# Patient Record
Sex: MALE | Race: OTHER | HISPANIC OR LATINO | URBAN - METROPOLITAN AREA
[De-identification: names, ages, dates, MRNs, and addresses within clinical notes are randomized per-mention and may not be internally consistent; named-entity substitution may affect disease eponyms.]

---

## 2021-04-19 ENCOUNTER — EMERGENCY (EMERGENCY)
Facility: HOSPITAL | Age: 26
LOS: 1 days | Discharge: HOME | End: 2021-04-19
Attending: EMERGENCY MEDICINE | Admitting: EMERGENCY MEDICINE
Payer: MEDICAID

## 2021-04-19 VITALS
DIASTOLIC BLOOD PRESSURE: 68 MMHG | WEIGHT: 164.91 LBS | HEART RATE: 88 BPM | SYSTOLIC BLOOD PRESSURE: 110 MMHG | OXYGEN SATURATION: 96 % | RESPIRATION RATE: 18 BRPM | TEMPERATURE: 98 F

## 2021-04-19 DIAGNOSIS — Z91.013 ALLERGY TO SEAFOOD: ICD-10-CM

## 2021-04-19 DIAGNOSIS — H57.11 OCULAR PAIN, RIGHT EYE: ICD-10-CM

## 2021-04-19 DIAGNOSIS — H20.9 UNSPECIFIED IRIDOCYCLITIS: ICD-10-CM

## 2021-04-19 PROCEDURE — 99283 EMERGENCY DEPT VISIT LOW MDM: CPT

## 2021-04-19 RX ORDER — CIPROFLOXACIN HCL 0.3 %
2 DROPS OPHTHALMIC (EYE)
Qty: 1 | Refills: 0
Start: 2021-04-19 | End: 2021-04-23

## 2021-04-19 NOTE — ED ADULT TRIAGE NOTE - CHIEF COMPLAINT QUOTE
Pt presents reporting his dog scratched his left eye last wednesday, left eye healed. Then his dog scratched his right eye on friday, eye is persistently red, teary, painful w/ movement.

## 2021-04-19 NOTE — ED PROVIDER NOTE - OBJECTIVE STATEMENT
25 y/o male with no significant PMHx presents today c/o right eye irritation x 3 days s/p dog's nose scratching his eye. Pt admits to blurry vision, EOM pain, photophobia, and increased lacrimation. Pt denies any discharge from eye. Pt states his left eye was scratched by the dog's whisker 5 days ago, but that eye has healed completely. Pt has taken Benadryl to sleep and used "Clear Eyes" eye drops. Pt denies wearing contacts. 27 y/o male with no significant PMHx presents today c/o right eye irritation x 3 days s/p dog's nose scratching his eye. Pt admits to blurry vision, photophobia, and increased lacrimation. Pt denies any discharge from eye. Pt states his left eye was scratched by the dog's whisker 5 days ago, but that eye has healed completely. Pt has taken Benadryl to sleep and used "Clear Eyes" eye drops. Pt denies wearing contacts.

## 2021-04-19 NOTE — ED PROVIDER NOTE - CLINICAL SUMMARY MEDICAL DECISION MAKING FREE TEXT BOX
right eye pain s/p struck by dog nose. visual acuity intact. no abrasion or ulcer on exam. eye irrigated with improvement suspect iritis. will tx with ciloxan and f/u with opth. return precautions d/w pt.

## 2021-04-19 NOTE — ED PROVIDER NOTE - PATIENT PORTAL LINK FT
You can access the FollowMyHealth Patient Portal offered by [facility] [place] by registering at the following website: [email or web address]. By joining [employer]’s FollowMyHealth portal, you will also be able to view your health information using other applications (apps) compatible with our system.

## 2021-04-19 NOTE — ED PROVIDER NOTE - EYES, MLM
Walk in [employer] right eye injected.. PERLL b/l. EOMI. no d/c. no uptake of dye. no visible ulcer or abrasion. no FB. lid inverted. visual acuity 20/30 right and 20/15 left. eye irrigated with improvement

## 2021-04-19 NOTE — ED PROVIDER NOTE - PHYSICAL EXAMINATION
Klep[place]: unable to use slit lamp at this time. has minimal pain to R eye w/ penlight, has no pain to R eye when penlight is shone into L eye. no hyphema, no chemosis.

## 2021-04-19 NOTE — ED ADULT NURSE NOTE - NSIMPLEMENTINTERV_GEN_ALL_ED
Implemented All Universal Safety Interventions:  [place] to call system. Call bell, personal items and telephone within reach. Instruct patient to call for assistance. Room bathroom lighting operational. Non-slip footwear when patient is off stretcher. Physically safe environment: no spills, clutter or unnecessary equipment. Stretcher in lowest position, wheels locked, appropriate side rails in place.

## 2021-04-19 NOTE — ED PROVIDER NOTE - NSFOLLOWUPCLINICS_GEN_ALL_ED_FT
[facility] - Ophthalmology Clinic  Ophthalmology  [address] [place] [address], 1st [address]  [place], [place] [address]  Phone: [phone]  Fax:   Follow Up Time: Urgent

## 2021-04-19 NOTE — ED PROVIDER NOTE - ATTENDING CONTRIBUTION TO CARE
26M no PMH p/w eye pain. 3d ago pt's dog jumped at him and dog's nose hit him directly in R eye. Since then he has pain to R eye, mild blurry vision and lacrimation. Symptoms actually slowly improving but still persistent so GF convinced pt to come to ED.   Denies diplopia, HA, NVD, f/c, other injuries, FB sensation. Vitals wnl, exam as above. Very well appearing.   ddx: Concern for possible traumatic iritis vs. conjunctivitis.   Symptoms occurred 3d ago and slowly improving - Clinically no indication for further emergent ophtho consult or ED workup or hospitalization at this time. Comfortable for dc, outpt f/u. discussed importance of outpt ophtho f/u.

## 2021-04-19 NOTE — ED PROVIDER NOTE - NSFOLLOWUPINSTRUCTIONS_ED_ALL_ED_FT
Follow up with ophthamology in one day.       Iritis    WHAT YOU NEED TO KNOW:    Iritis is inflammation of your iris. The iris is the colored part of your eye.    Eye Anatomy         DISCHARGE INSTRUCTIONS:    Call your doctor or ophthalmologist if:   •You have severe eye pain and a headache.      •Your vision suddenly gets worse.      •You have nausea or are vomiting.      •Your pain gets worse, even after treatment.      •You see halos or rainbows around lights.      •You have questions or concerns about your condition or care.      Medicines: You may need any of the following:   •Cycloplegic eyedrops dilate your pupil and relax your eye muscles. This helps decrease pain and light sensitivity.      •Steroid eyedrops help decrease pain and inflammation. These are only used for a short time to relieve the inflammation. You may be given steroid medicine as pills if the cause of your iritis is not an infection.      •Acetaminophen decreases pain and fever. It is available without a doctor's order. Ask how much to take and how often to take it. Follow directions. Read the labels of all other medicines you are using to see if they also contain acetaminophen, or ask your doctor or pharmacist. Acetaminophen can cause liver damage if not taken correctly. Do not use more than 4 grams (4,000 milligrams) total of acetaminophen in one day.       •Take your medicine as directed. Contact your healthcare provider if you think your medicine is not helping or if you have side effects. Tell him of her if you are allergic to any medicine. Keep a list of the medicines, vitamins, and herbs you take. Include the amounts, and when and why you take them. Bring the list or the pill bottles to follow-up visits. Carry your medicine list with you in case of an emergency.      Manage iritis:   •Apply a warm compress to your eye. Wet a washcloth in warm water and wring it out. Place it gently over your eye for 20 minutes 3 to 4 times each day. This will help soothe your eye and decrease inflammation.      •Wear dark sunglasses. This will help prevent pain and light sensitivity. Make sure the sunglasses have UVA and UVB protection. This will protect your eyes when you go outside.      •Use eyedrops safely. If your treatment plan includes eyedrops, it is important to use them as directed. Your provider may give you detailed instructions to follow. The eyedrops may also come with safety instructions. Follow all instructions to help prevent an infection. Do not touch the tip of the bottle to your eye. Germs from your eye can spread to the medicine bottle.  Steps 1 2 3 4           Follow up with your doctor or ophthalmologist within 24 hours: Write down your questions so you remember to ask them during your visits.       © Copyright [employer] 2021           back to top                          © Copyright [employer] 2021

## 2021-04-19 NOTE — ED ADULT NURSE NOTE - OBJECTIVE STATEMENT
26 y.o M a&ox4 walked in from triage c.o eye pain. since Friday pt reports R eye pain and tearing. PT states " my dog jumped up and hit my eye. I cant open it." reports R eye pain. no fevers, chills.

## 2023-09-06 ENCOUNTER — EMERGENCY (EMERGENCY)
Facility: HOSPITAL | Age: 28
LOS: 1 days | Discharge: HOME | End: 2023-09-06
Attending: EMERGENCY MEDICINE | Admitting: EMERGENCY MEDICINE
Payer: COMMERCIAL

## 2023-09-06 VITALS
OXYGEN SATURATION: 99 % | HEART RATE: 64 BPM | DIASTOLIC BLOOD PRESSURE: 64 MMHG | SYSTOLIC BLOOD PRESSURE: 118 MMHG | TEMPERATURE: 98 F | RESPIRATION RATE: 18 BRPM

## 2023-09-06 VITALS
SYSTOLIC BLOOD PRESSURE: 121 MMHG | TEMPERATURE: 98 F | HEART RATE: 61 BPM | DIASTOLIC BLOOD PRESSURE: 81 MMHG | RESPIRATION RATE: 18 BRPM | OXYGEN SATURATION: 99 % | WEIGHT: 162.04 LBS

## 2023-09-06 DIAGNOSIS — R19.7 DIARRHEA, UNSPECIFIED: ICD-10-CM

## 2023-09-06 DIAGNOSIS — Z91.013 ALLERGY TO SEAFOOD: ICD-10-CM

## 2023-09-06 DIAGNOSIS — K51.90 ULCERATIVE COLITIS, UNSPECIFIED, WITHOUT COMPLICATIONS: ICD-10-CM

## 2023-09-06 DIAGNOSIS — F17.210 NICOTINE DEPENDENCE, CIGARETTES, UNCOMPLICATED: ICD-10-CM

## 2023-09-06 LAB
ALBUMIN SERPL ELPH-MCNC: 4 G/DL — SIGNIFICANT CHANGE UP (ref 3.3–5)
ALP SERPL-CCNC: 96 U/L — SIGNIFICANT CHANGE UP (ref 40–120)
ALT FLD-CCNC: 11 U/L — SIGNIFICANT CHANGE UP (ref 10–45)
ANION GAP SERPL CALC-SCNC: 9 MMOL/L — SIGNIFICANT CHANGE UP (ref 5–17)
AST SERPL-CCNC: 23 U/L — SIGNIFICANT CHANGE UP (ref 10–40)
BASOPHILS # BLD AUTO: 0.06 K/UL — SIGNIFICANT CHANGE UP (ref 0–0.2)
BASOPHILS NFR BLD AUTO: 0.5 % — SIGNIFICANT CHANGE UP (ref 0–2)
BILIRUB SERPL-MCNC: 0.5 MG/DL — SIGNIFICANT CHANGE UP (ref 0.2–1.2)
BUN SERPL-MCNC: 6 MG/DL — LOW (ref 7–23)
CALCIUM SERPL-MCNC: 9.7 MG/DL — SIGNIFICANT CHANGE UP (ref 8.4–10.5)
CHLORIDE SERPL-SCNC: 101 MMOL/L — SIGNIFICANT CHANGE UP (ref 96–108)
CO2 SERPL-SCNC: 29 MMOL/L — SIGNIFICANT CHANGE UP (ref 22–31)
CREAT SERPL-MCNC: 1.14 MG/DL — SIGNIFICANT CHANGE UP (ref 0.5–1.3)
EGFR: 90 ML/MIN/1.73M2 — SIGNIFICANT CHANGE UP
EOSINOPHIL # BLD AUTO: 0.19 K/UL — SIGNIFICANT CHANGE UP (ref 0–0.5)
EOSINOPHIL NFR BLD AUTO: 1.5 % — SIGNIFICANT CHANGE UP (ref 0–6)
GLUCOSE SERPL-MCNC: 86 MG/DL — SIGNIFICANT CHANGE UP (ref 70–99)
HCT VFR BLD CALC: 47.9 % — SIGNIFICANT CHANGE UP (ref 39–50)
HGB BLD-MCNC: 16.4 G/DL — SIGNIFICANT CHANGE UP (ref 13–17)
IMM GRANULOCYTES NFR BLD AUTO: 0.4 % — SIGNIFICANT CHANGE UP (ref 0–0.9)
LIDOCAIN IGE QN: 69 U/L — HIGH (ref 7–60)
LYMPHOCYTES # BLD AUTO: 1.27 K/UL — SIGNIFICANT CHANGE UP (ref 1–3.3)
LYMPHOCYTES # BLD AUTO: 9.7 % — LOW (ref 13–44)
MCHC RBC-ENTMCNC: 29.7 PG — SIGNIFICANT CHANGE UP (ref 27–34)
MCHC RBC-ENTMCNC: 34.2 GM/DL — SIGNIFICANT CHANGE UP (ref 32–36)
MCV RBC AUTO: 86.6 FL — SIGNIFICANT CHANGE UP (ref 80–100)
MONOCYTES # BLD AUTO: 0.92 K/UL — HIGH (ref 0–0.9)
MONOCYTES NFR BLD AUTO: 7 % — SIGNIFICANT CHANGE UP (ref 2–14)
NEUTROPHILS # BLD AUTO: 10.58 K/UL — HIGH (ref 1.8–7.4)
NEUTROPHILS NFR BLD AUTO: 80.9 % — HIGH (ref 43–77)
NRBC # BLD: 0 /100 WBCS — SIGNIFICANT CHANGE UP (ref 0–0)
PLATELET # BLD AUTO: 266 K/UL — SIGNIFICANT CHANGE UP (ref 150–400)
POTASSIUM SERPL-MCNC: SIGNIFICANT CHANGE UP MMOL/L (ref 3.5–5.3)
POTASSIUM SERPL-SCNC: SIGNIFICANT CHANGE UP MMOL/L (ref 3.5–5.3)
PROT SERPL-MCNC: 8.1 G/DL — SIGNIFICANT CHANGE UP (ref 6–8.3)
RBC # BLD: 5.53 M/UL — SIGNIFICANT CHANGE UP (ref 4.2–5.8)
RBC # FLD: 13.3 % — SIGNIFICANT CHANGE UP (ref 10.3–14.5)
SODIUM SERPL-SCNC: 139 MMOL/L — SIGNIFICANT CHANGE UP (ref 135–145)
WBC # BLD: 13.07 K/UL — HIGH (ref 3.8–10.5)
WBC # FLD AUTO: 13.07 K/UL — HIGH (ref 3.8–10.5)

## 2023-09-06 PROCEDURE — 99284 EMERGENCY DEPT VISIT MOD MDM: CPT | Mod: 25

## 2023-09-06 PROCEDURE — 85025 COMPLETE CBC W/AUTO DIFF WBC: CPT

## 2023-09-06 PROCEDURE — 96374 THER/PROPH/DIAG INJ IV PUSH: CPT | Mod: XU

## 2023-09-06 PROCEDURE — G1004: CPT

## 2023-09-06 PROCEDURE — 74177 CT ABD & PELVIS W/CONTRAST: CPT | Mod: ME

## 2023-09-06 PROCEDURE — 80053 COMPREHEN METABOLIC PANEL: CPT

## 2023-09-06 PROCEDURE — 36415 COLL VENOUS BLD VENIPUNCTURE: CPT

## 2023-09-06 PROCEDURE — 74177 CT ABD & PELVIS W/CONTRAST: CPT | Mod: 26,ME

## 2023-09-06 PROCEDURE — 83690 ASSAY OF LIPASE: CPT

## 2023-09-06 PROCEDURE — 99285 EMERGENCY DEPT VISIT HI MDM: CPT

## 2023-09-06 PROCEDURE — 96375 TX/PRO/DX INJ NEW DRUG ADDON: CPT

## 2023-09-06 RX ORDER — ONDANSETRON 8 MG/1
4 TABLET, FILM COATED ORAL ONCE
Refills: 0 | Status: COMPLETED | OUTPATIENT
Start: 2023-09-06 | End: 2023-09-06

## 2023-09-06 RX ORDER — FAMOTIDINE 10 MG/ML
20 INJECTION INTRAVENOUS ONCE
Refills: 0 | Status: COMPLETED | OUTPATIENT
Start: 2023-09-06 | End: 2023-09-06

## 2023-09-06 RX ORDER — IOHEXOL 300 MG/ML
30 INJECTION, SOLUTION INTRAVENOUS ONCE
Refills: 0 | Status: COMPLETED | OUTPATIENT
Start: 2023-09-06 | End: 2023-09-06

## 2023-09-06 RX ORDER — MORPHINE SULFATE 50 MG/1
4 CAPSULE, EXTENDED RELEASE ORAL ONCE
Refills: 0 | Status: DISCONTINUED | OUTPATIENT
Start: 2023-09-06 | End: 2023-09-06

## 2023-09-06 RX ORDER — OMEPRAZOLE 10 MG/1
1 CAPSULE, DELAYED RELEASE ORAL
Qty: 14 | Refills: 0
Start: 2023-09-06 | End: 2023-09-19

## 2023-09-06 RX ORDER — HYDROCORTISONE 20 MG
1 TABLET ORAL
Qty: 5 | Refills: 0
Start: 2023-09-06 | End: 2023-09-10

## 2023-09-06 RX ORDER — OXYCODONE AND ACETAMINOPHEN 5; 325 MG/1; MG/1
1 TABLET ORAL
Qty: 12 | Refills: 0
Start: 2023-09-06 | End: 2023-09-08

## 2023-09-06 RX ORDER — HYDROMORPHONE HYDROCHLORIDE 2 MG/ML
0.5 INJECTION INTRAMUSCULAR; INTRAVENOUS; SUBCUTANEOUS ONCE
Refills: 0 | Status: DISCONTINUED | OUTPATIENT
Start: 2023-09-06 | End: 2023-09-06

## 2023-09-06 RX ORDER — SODIUM CHLORIDE 9 MG/ML
1000 INJECTION INTRAMUSCULAR; INTRAVENOUS; SUBCUTANEOUS ONCE
Refills: 0 | Status: COMPLETED | OUTPATIENT
Start: 2023-09-06 | End: 2023-09-06

## 2023-09-06 RX ADMIN — FAMOTIDINE 20 MILLIGRAM(S): 10 INJECTION INTRAVENOUS at 20:08

## 2023-09-06 RX ADMIN — HYDROMORPHONE HYDROCHLORIDE 0.5 MILLIGRAM(S): 2 INJECTION INTRAMUSCULAR; INTRAVENOUS; SUBCUTANEOUS at 18:16

## 2023-09-06 RX ADMIN — MORPHINE SULFATE 4 MILLIGRAM(S): 50 CAPSULE, EXTENDED RELEASE ORAL at 15:17

## 2023-09-06 RX ADMIN — IOHEXOL 30 MILLILITER(S): 300 INJECTION, SOLUTION INTRAVENOUS at 15:17

## 2023-09-06 RX ADMIN — SODIUM CHLORIDE 1000 MILLILITER(S): 9 INJECTION INTRAMUSCULAR; INTRAVENOUS; SUBCUTANEOUS at 15:17

## 2023-09-06 RX ADMIN — ONDANSETRON 4 MILLIGRAM(S): 8 TABLET, FILM COATED ORAL at 15:36

## 2023-09-06 NOTE — ED PROVIDER NOTE - NSFOLLOWUPINSTRUCTIONS_ED_ALL_ED_FT
follow up Dr Hilton in 5 days  return for fever, worsening pain, vomiting, any other worsening symptoms    Ulcerative Colitis, Adult    Ulcerative colitis is long-term (chronic) inflammation of the large intestine (colon) and rectum. Sores (ulcers) may also form in these areas.    Ulcerative colitis, along with a closely related condition called Crohn's disease, is often referred to as inflammatory bowel disease.    What are the causes?  This condition may be caused by increased activity of the immune system in the intestines. The immune system is the system that protects the body against harmful bacteria, viruses, fungi, and other things that can make you sick. The cause of the increased activity of the immune system is not known.    What increases the risk?  The following factors may make you more likely to develop this condition:  Being under 30 years old.  Having a family history of ulcerative colitis.  What are the signs or symptoms?  Symptoms vary depending on how severe the condition is. Common symptoms include:  Rectal bleeding.  Diarrhea, often with blood or pus in the stool.  Other symptoms can include:  Pain or cramping in the abdomen.  Fever.  Tiredness (fatigue).  Nausea, loss of appetite, or weight loss.  Rectal pain.  A strong and sudden need to have a bowel movement (bowel urgency).  Anemia.  Yellowing of the skin (jaundice) from liver dysfunction or skin rashes.  Symptoms can range from mild to severe. They may come and go.    How is this diagnosed?  This condition may be diagnosed based on:  Your symptoms and medical history.  A physical exam.  Tests, including:  Blood tests and stool tests.  X-rays.  CT scan.  MRI.  Colonoscopy. For this test, a flexible tube is inserted into your anus, and your colon is examined.  Biopsy. In this test, a tissue sample is taken from your colon and examined under a microscope.  How is this treated?  There is no cure for this condition, but it can be managed. Treatment depends on the severity of the disease. Treatment for this condition may include medicines to:  Decrease swelling and inflammation.  Control your immune system.  Treat infections.  Relieve pain.  Control diarrhea.  Severe flare-ups may need to be treated at a hospital. Treatment in a hospital may involve:  Resting the bowel. This involves not eating or drinking for a period of time.  Getting medicines through an IV.  Getting fluids and nutrition through:  An IV.  A tube that is passed through the nose and into the stomach (nasogastric or NG tube).  Surgery to remove the affected part of the colon. This may be done if other treatments are not helping.  This condition increases the risk of colon cancer. Adults with this condition will need to be checked for colon cancer throughout life.    Follow these instructions at home:  Medicines and vitamins    Take over-the-counter and prescription medicines only as told by your health care provider. Do not take aspirin.  If you were prescribed an antibiotic medicine, take it as told by your health care provider. Do not stop taking the antibiotic even if you start to feel better.  Ask your health care provider if you should take any vitamins or supplements. You may need to take:  Calcium and vitamin D for bone health.  Iron to help treat anemia.  Lifestyle    Exercise regularly.  Work with your health care provider to manage your condition and educate yourself about your condition.  Do not use any products that contain nicotine or tobacco. These products include cigarettes, chewing tobacco, and vaping devices, such as e-cigarettes. If you need help quitting, ask your health care provider.  If you drink alcohol:  Limit how much you have to:  0–1 drink a day for women who are not pregnant.  0–2 drinks a day for men.  Know how much alcohol is in a drink. In the [place] one drink equals one 12 oz bottle of beer (355 mL), one 5 oz glass of wine (148 mL), or one 1½ oz glass of hard liquor (44 mL).  Eating and drinking    Keep a food diary. This may help you identify and avoid any foods that trigger your symptoms.  Drink enough fluid to keep your urine pale yellow.  Follow a well-balanced diet as told by your health care provider. This may include:  Avoiding carbonated drinks.  Avoiding popcorn, vegetable skins, nuts, and other high-fiber foods.  Avoiding high-fat foods.  Eating smaller meals, but more often.  Limiting sugary drinks.  Limiting caffeine.  Follow food safety recommendations as told by your health care provider. This may include making sure you:  Avoid eating raw or undercooked meat, fish, or eggs.  Do not eat or drink spoiled or  foods and drinks.  General instructions    Wash your hands often with soap and water for at least 20 seconds. If soap and water are not available, use hand .  Stay up to date on your vaccinations, including a yearly (annual) flu shot. Ask your health care provider which vaccines you should get.  Have cancer screening tests as told by your health care provider. Ulcerative colitis may place you at increased risk for colon cancer.  Keep all follow-up visits. This is important.  Where to find more information  You can find some helpful and educational information about ulcerative colitis at the [employer] and [employer]est[employer] Kid[employer] Diseases online here: www.niddk.nih.gov    Contact a health care provider if:  Your symptoms do not improve or they get worse with treatment.  You continue to lose weight.  You have constant cramps or loose stools.  You develop a new skin rash, skin sores, or eye problems.  You have a fever or chills.  Get help right away if:  You have bloody diarrhea.  You have severe bleeding from the rectum.  You feel that your heart is racing.  You have severe pain in your abdomen.  Your abdomen swells (abdominal distension).  Your abdomen is tender to the touch.  You vomit.  Summary  Ulcerative colitis is long-lasting (chronic) inflammation of the large intestine (colon) and rectum. Sores (ulcers) may also form in these areas.  Follow instructions from your health care provider about medicines, lifestyle changes, and eating and drinking.  Contact your health care provider if symptoms do not improve or they get worse with treatment.  Get help right away if you have severe abdominal pain, abdominal swelling, or severe bleeding from the rectum.  Keep all follow-up visits. This is important.  This information is not intended to replace advice given to you by your health care provider. Make sure you discuss any questions you have with your health care provider.    Document Revised: 2021 Document Reviewed: 2021

## 2023-09-06 NOTE — ED PROVIDER NOTE - PATIENT PORTAL LINK FT
You can access the FollowMyHealth Patient Portal offered by [facility] by registering at the following website: [email or web address]. By joining [employer]’s FollowMyHealth portal, you will also be able to view your health information using other applications (apps) compatible with our system.

## 2023-09-06 NOTE — ED PROVIDER NOTE - OBJECTIVE STATEMENT
28-year-old male with history of ulcerative colitis for the past 4 years severe mid to left abdominal pain for past day and a loose stools with some blood in it.  He contacted his GI doctor 3 days ago who started him on a p.o. medication.  last colonoscopy 1 year ago never had a CAT scan.  Never had to be hospitalized for this.  no fever or vomiting.  Able to drink fluids today appetite

## 2023-09-06 NOTE — ED ADULT NURSE NOTE - OBJECTIVE STATEMENT
28yoM PMH UC, came to the ED c/o abdominal pain N/V/D X2 days. Pt states he had two episodes of green emesis yesterday and today, multiple episodes of bloody diarrhea as well. Pt states he originally thought it was hunger pain, and that this doesn't feel like a UC flare up. Denies chest pain, SOB, HA, fever.

## 2023-09-06 NOTE — ED PROVIDER NOTE - CLINICAL SUMMARY MEDICAL DECISION MAKING FREE TEXT BOX
hx of ulc colitis with probable flare, abd pain and diarrhea with blood tinged.  will get labs, ct abd, give pain meds

## 2023-09-06 NOTE — ED ADULT NURSE NOTE - NSFALLUNIVINTERV_ED_ALL_ED
Bed/Stretcher in lowest position, wheels locked, appropriate side rails in place/Call bell, personal items and telephone in reach/Instruct patient to call for assistance before getting out of bed/chair/stretcher/Non-slip footwear applied when patient is off stretcher/[place] to call system/Physically safe environment - no spills, clutter or unnecessary equipment/Purposeful proactive rounding/Room/bathroom lighting operational, light cord in reach

## 2023-09-06 NOTE — ED PROVIDER NOTE - NOTES
spoke with partner of Dr Hilton.  he wants pt to have once a day hydrocortisone enema and to see Dr Hilton in 5 days

## 2023-09-06 NOTE — ED ADULT TRIAGE NOTE - CHIEF COMPLAINT QUOTE
Pt aaox3 c/o periumbilical abd pain for 3x days. Pt c/o diarrhea and 1x N/V. Pt has pmhx of ulcerative colitis.

## 2023-09-07 RX ORDER — HYDROCORTISONE 20 MG
1 TABLET ORAL
Refills: 0
Start: 2023-09-07 | End: 2023-09-11

## 2023-09-07 RX ORDER — HYDROCORTISONE 20 MG
1 TABLET ORAL
Qty: 5 | Refills: 0
Start: 2023-09-07 | End: 2023-09-11

## 2023-09-07 RX ORDER — OXYCODONE AND ACETAMINOPHEN 5; 325 MG/1; MG/1
1 TABLET ORAL
Qty: 12 | Refills: 0
Start: 2023-09-07 | End: 2023-09-09

## 2024-04-19 ENCOUNTER — EMERGENCY (EMERGENCY)
Facility: HOSPITAL | Age: 29
LOS: 1 days | Discharge: HOME | End: 2024-04-19
Attending: STUDENT IN AN ORGANIZED HEALTH CARE EDUCATION/TRAINING PROGRAM | Admitting: STUDENT IN AN ORGANIZED HEALTH CARE EDUCATION/TRAINING PROGRAM
Payer: COMMERCIAL

## 2024-04-19 VITALS
RESPIRATION RATE: 17 BRPM | OXYGEN SATURATION: 99 % | WEIGHT: 160.06 LBS | TEMPERATURE: 99 F | HEART RATE: 75 BPM | DIASTOLIC BLOOD PRESSURE: 64 MMHG | SYSTOLIC BLOOD PRESSURE: 114 MMHG | HEIGHT: 72 IN

## 2024-04-19 PROCEDURE — 99283 EMERGENCY DEPT VISIT LOW MDM: CPT

## 2024-04-19 PROCEDURE — 99282 EMERGENCY DEPT VISIT SF MDM: CPT

## 2024-04-19 NOTE — ED ADULT TRIAGE NOTE - CHIEF COMPLAINT QUOTE
29M presents to ED c/o atraumatic R lower back pain not associated with numbness/tingling or bowel/bladder incontinence x2 weeks. pt ambulatory with steady gait. A&Ox4

## 2024-04-19 NOTE — ED PROVIDER NOTE - CLINICAL SUMMARY MEDICAL DECISION MAKING FREE TEXT BOX
Concern for musculoskeletal buttock pain given worsened with exertion/lifting.  No sign or symptom of acute or impending spinal cord compromise.  No sign of infection, including abscess/cellulitis/fascitis as cause for pain.  Advised patient to follow up with primary care physician, discussed OTC pain control.  Plan to discharge home with return precautions and outpatient followup.

## 2024-04-19 NOTE — ED PROVIDER NOTE - OBJECTIVE STATEMENT
29M with UC, now with chronic intermittent right upper buttock pain, exacerbated by lifting or exercise.  Has most recently had the pain for 2 weeks, no recent trauma/fall.  No groin or lower extremity numbness.  No fevers or hx IVDU.  No cancer history.  No bowel/bladder incontinence.  No leg weakness.  Able to walk.

## 2024-04-19 NOTE — ED PROVIDER NOTE - NSFOLLOWUPINSTRUCTIONS_ED_ALL_ED_FT
Chronic Back Pain  Chronic back pain is back pain that lasts longer than 3 months. The cause of your back pain may not be known. Some common causes include:  Wear and tear (degenerative disease) of the bones, disks, or tissues that connect bones to each other (ligaments) in your back.  Inflammation and stiffness in your back (arthritis).  If you have chronic back pain, you may have times when the pain is more intense (flare-ups). You can also learn to manage the pain with home care.    Follow these instructions at home:  Watch for any changes in your symptoms. Take these actions to help with your pain:    Managing pain and stiffness    A bag of ice on a towel on the skin.  A heating pad for use on the affected area.  If told, put ice on the painful area. You may be told to apply ice for the first 24–48 hours after a flare-up starts.  Put ice in a plastic bag.  Place a towel between your skin and the bag.  Leave the ice on for 20 minutes, 2–3 times per day.  If told, apply heat to the affected area as often as told by your health care provider. Use the heat source that your provider recommends, such as a moist heat pack or a heating pad.  Place a towel between your skin and the heat source.  Leave the heat on for 20–30 minutes.  If your skin turns bright red, remove the ice or heat right away to prevent skin damage. The risk of damage is higher if you cannot feel pain, heat, or cold.  Try soaking in a warm tub.  Activity    A person bending down and showing the correct posture to use when lifting a heavy object.  A person showing good posture while sitting at a desk and using a computer.  A person standing and ironing with one foot resting on a stable footstool to help keep the spine neutral.  Avoid bending and other activities that make the pain worse.  Have good posture when you stand or sit.  When you stand, keep your upper back and neck straight, with your shoulders pulled back. Avoid slouching.  When you sit, keep your back straight. Relax your shoulders. Do not round your shoulders or pull them backward.  Do not sit or  one place for too long.  Take brief periods of rest during the day. This will reduce your pain. Resting in a lying or standing position is often better than sitting to rest.  When you rest for longer periods, mix in some mild activity or stretching between periods of rest. This will help to prevent stiffness and pain.  Get regular exercise. Ask your provider what activities are safe for you.  You may have to avoid lifting. Ask your provider how much you can safely lift. If you do lift, always use the right technique. This means you should:  Bend your knees.  Keep the load close to your body.  Avoid twisting.  Medicines    Take over-the-counter and prescription medicines only as told by your provider.  You may need to take medicines for pain and inflammation. These may be taken by mouth or put on the skin. You may also be given muscle relaxants.  Ask your provider if the medicine prescribed to you:  Requires you to avoid driving or using machinery.  Can cause constipation. You may need to take these actions to prevent or treat constipation:  Drink enough fluid to keep your pee (urine) pale yellow.  Take over-the-counter or prescription medicines.  Eat foods that are high in fiber, such as beans, whole grains, and fresh fruits and vegetables.  Limit foods that are high in fat and processed sugars, such as fried or sweet foods.  General instructions    A person with one pillow sleeping on their side using the correct posture, keeping the back straight, shown by dashed line.  Sleep on a firm mattress in a comfortable position. Try lying on your side with your knees slightly bent. If you lie on your back, put a pillow under your knees.  Do not use any products that contain nicotine or tobacco. These products include cigarettes, chewing tobacco, and vaping devices, such as e-cigarettes. If you need help quitting, ask your provider.  Contact a health care provider if:  You have pain that does not get better with rest or medicine.  You have new pain.  You have a fever.  You lose weight quickly.  You have trouble doing your normal activities.  You feel weak or numb in one or both of your legs or feet.  Get help right away if:  You are not able to control when you pee or poop.  You have severe back pain and:  Nausea or vomiting.  Pain in your chest or abdomen.  Shortness of breath.  You faint.

## 2024-04-19 NOTE — ED ADULT NURSE NOTE - OBJECTIVE STATEMENT
29 yr old male c/o back pain x2 weeks. Pt denies any trauma to the back. Pt states pain is mainly on right side. Pt denies chest pain, SOB, fevers, chills, N/V. Respirations spontaneous and unlabored. A&Ox4. NAD. ALAN.

## 2024-04-19 NOTE — ED PROVIDER NOTE - PATIENT PORTAL LINK FT
You can access the FollowMyHealth Patient Portal offered by [facility] by registering at the following website: [email or web address]. By joining [employer]’s FollowMyHealth portal, you will also be able to view your health information using other applications (apps) compatible with our system.

## 2024-04-19 NOTE — ED PROVIDER NOTE - PHYSICAL EXAMINATION
General: comfortable, resting in ED  HEENT: atraumatic, no eye erythema or discharge  Pulm: no cyanosis, no added work of breathing  Cardiac: no pallor, intact peripheral pulse  GI: no abdominal distension  Neuro: alert, conversant  Psych: neutral affect, cooperative  Msk: no gross deformity or instability over midline spine  Skin: no erythema or rash over right buttock, no crepitus/fluctuance/induration

## 2024-04-19 NOTE — ED PROVIDER NOTE - NSFOLLOWUPCLINICS_GEN_ALL_ED_FT
[facility]  Family Medicine  [address], [address]  [place], [place] [address]  Phone: [phone]  Fax:

## 2024-04-22 DIAGNOSIS — Z91.013 ALLERGY TO SEAFOOD: ICD-10-CM

## 2024-04-22 DIAGNOSIS — M54.50 LOW BACK PAIN, UNSPECIFIED: ICD-10-CM
